# Patient Record
Sex: FEMALE | Race: WHITE | Employment: FULL TIME | URBAN - METROPOLITAN AREA
[De-identification: names, ages, dates, MRNs, and addresses within clinical notes are randomized per-mention and may not be internally consistent; named-entity substitution may affect disease eponyms.]

---

## 2019-08-25 ENCOUNTER — HOSPITAL ENCOUNTER (EMERGENCY)
Facility: CLINIC | Age: 69
Discharge: HOME | End: 2019-08-25
Attending: NURSE PRACTITIONER | Admitting: NURSE PRACTITIONER
Payer: COMMERCIAL

## 2019-08-25 ENCOUNTER — APPOINTMENT (OUTPATIENT)
Dept: ULTRASOUND IMAGING | Facility: CLINIC | Age: 69
End: 2019-08-25
Attending: NURSE PRACTITIONER
Payer: COMMERCIAL

## 2019-08-25 ENCOUNTER — APPOINTMENT (OUTPATIENT)
Dept: GENERAL RADIOLOGY | Facility: CLINIC | Age: 69
End: 2019-08-25
Attending: NURSE PRACTITIONER
Payer: COMMERCIAL

## 2019-08-25 VITALS
TEMPERATURE: 98.8 F | BODY MASS INDEX: 31.83 KG/M2 | WEIGHT: 173 LBS | HEIGHT: 62 IN | SYSTOLIC BLOOD PRESSURE: 146 MMHG | RESPIRATION RATE: 16 BRPM | DIASTOLIC BLOOD PRESSURE: 100 MMHG | OXYGEN SATURATION: 99 %

## 2019-08-25 DIAGNOSIS — M25.562 LEFT KNEE PAIN: ICD-10-CM

## 2019-08-25 DIAGNOSIS — M77.9 ENTHESOPATHY: ICD-10-CM

## 2019-08-25 PROCEDURE — 93971 EXTREMITY STUDY: CPT | Mod: LT

## 2019-08-25 PROCEDURE — 99285 EMERGENCY DEPT VISIT HI MDM: CPT | Mod: 25

## 2019-08-25 PROCEDURE — 73562 X-RAY EXAM OF KNEE 3: CPT | Mod: LT

## 2019-08-25 PROCEDURE — 29505 APPLICATION LONG LEG SPLINT: CPT | Mod: LT

## 2019-08-25 ASSESSMENT — ENCOUNTER SYMPTOMS
ARTHRALGIAS: 1
FEVER: 0
SHORTNESS OF BREATH: 0
MYALGIAS: 1

## 2019-08-25 ASSESSMENT — MIFFLIN-ST. JEOR: SCORE: 1267.97

## 2019-08-25 NOTE — ED TRIAGE NOTES
Pain to lt knee for the past 10 day, no injury, is standing [place] [employer] and increased pain

## 2019-08-25 NOTE — ED PROVIDER NOTES
History     Chief Complaint:  Knee Pain    HPI  Kenyetta Carter is a 69 year old female with a history of a right mensicus tear who presents to the emergency department today for evaluation of left sided knee pain. 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 The patient has trouble bending her left knee and also reports some pain in the back of her leg especially with bending. She does think this feels similar to her previous meniscus tear. The patient denies any fever, chest pain, or shortness of breath. There has not been any trauma to the knee and she denies any recent travel.      PE/DVT RISK FACTORS:  Sex:  female                                                           Hormones- Negative                                        Tobacco- Negative                                          Cancer- Negative                                             Travel- Negative                                              Surgery- Negative                                            Other immobilization- Negative              Personal history of PE/DVT- Negative                     Family history of PE/DVT- Negative                           Allergies:  No known drug allergies    Medications:    The patient is not currently taking any prescribed medications.    Past Medical History:    Meniscus tear right    Past Surgical History:    orthopedic surgery  GYN surgery.    Family History:    History reviewed. No pertinent family history.     Social History:  The patient reports that she has never smoked. She has never used smokeless tobacco.   The patient arrives with her sister    Review of Systems   Constitutional: Negative for fever.   Respiratory: Negative for shortness of breath.    Cardiovascular: Negative for chest pain.   Musculoskeletal: Positive for arthralgias and myalgias.  "  All other systems reviewed and are negative.    Physical Exam     Patient Vitals for the past 24 hrs:   BP Temp Temp src Heart Rate Resp SpO2 Height Weight   08/25/19 2005 (!) 146/100 -- -- -- -- 99 % -- --   08/25/19 1719 (!) 154/95 98.8  F (37.1  C) Oral 75 16 99 % 1.575 m (5' 2\") 78.5 kg (173 lb)     Physical Exam  Nursing notes reviewed. Vitals reviewed.  General: Alert. Well kept.  Eyes: Conjunctiva non-injected, non-icteric.  Neck/Throat: Moist mucous membranes. Normal voice.  Cardiac: Regular rhythm. Normal heart sounds. 2+ DP pulses bilateral.  No peripheral edema.  Normal capillary refill.  Pulmonary: Clear and equal breath sounds bilaterally.   Musculoskeletal: Normal gross range of motion of all other extremities.  Left lower extremity: No foot or ankle deformity, tenderness or swelling. Able to flex and extend toes. Full range of motion of ankle. No knee effusion. No joint line tenderness. Tenderness to palpation of posterior and anterior inferior knee. No laxity with varus or valgus stress testing. Negative anterior and posterior drawer test. Full active flexion and extension at the knee. Full painless ROM at hip.   Neurological: Alert and oriented x4. 5/5 strength bilateral lower extremity. Distal sensation intact.  Skin: No laceration or ecchymosis to affected extremity.   Psych: Affect normal. Good eye contact.    Emergency Department Course     Imaging:  Radiology findings were communicated with the patient who voiced understanding of the findings.  XR Left knee 3 views  IMPRESSION:  Enthesopathic changes of the quadriceps tendon insertion  into the patella. Mild osteopenia. Otherwise negative left knee  x-rays. Reading per radiology    US Lower extremity Duplex Left  IMPRESSION: No evidence for DVT within the left lower extremity. Reading per radiology    Emergency Department Course:  Past medical records, nursing notes, and vitals reviewed.  1736: I performed an exam of the patient and obtained " history, as documented above.     The patient was sent for a knee XR and lower extremity US while in the emergency department, findings above.    2052: I rechecked the patient. Explained findings to patient.    I personally reviewed the laboratory/imaging results with the Patient and answered all related questions prior to discharge.     I rechecked the patient.  Findings and plan explained to the Patient. Patient discharged home with instructions regarding supportive care, medications, and reasons to return. The importance of close follow-up was reviewed.     Impression & Plan      Medical Decision Making:  Kenyetta Carter is a 69 year old female who presents with the above complaint and exam findings as noted above. X-ray s are negative for fracture or malalignment, but does show enthesopathic changes of the quadriceps tendon insertion into the patella.  She has no pain in this area and this is likely an incidental finding. She does have history of significant arthritis of her hands and feet. The patient is neurovascularly intact. Quad and patellar tendon function intact. The knee is grossly stable to stressing without obvious laxity. Given the exam and high clinical suspicion, I suspect internal derangement of the knee including possible meniscal injury versus osteoarthritis. For this reason, the patient will be treated as such with minimal weight bearing and knee immobilization. No erythema, warmth of the joint, or significant pain and septic joint and gout are considered unlikely.  No tenderness at the hip or foot to suggest concurrent injury.  US negative for DVT.  The patient will be treated with a knee immobilizer and I have recommended primary clinic or orthopedic follow up within 1 week for possible outpatient MRI or orthopedic referral. Ice in 20 min intervals and elevation recommended. Return for increasing pain, extremity weakness, numbness, or for any other questions or concerns discussed. The patient  voiced an understanding and is in agreement with the treatment plan and need for follow up for likely additional imaging or referral.     Diagnosis:    ICD-10-CM   1. Left knee pain M25.562   2. Enthesopathy M77.9       Disposition:   discharged to home      Scribe Disclosure:  I, Ibis Weir, am serving as a scribe at 5:36 PM on 8/25/2019 to document services personally performed by Letha Mesa DNP based on my observations and the provider's statements to me.     EMERGENCY DEPARTMENT       Letha Mesa, CNP  08/25/19 7497

## 2019-08-25 NOTE — ED AVS SNAPSHOT
Emergency Department  [address] [place] [id number]  Phone:  [phone]  Fax:  [phone]                                    Kenyetta Carter   MRN: [id number]    Department:   Emergency Department   Date of Visit:  8/25/2019           After Visit Summary Signature Page    I have received my discharge instructions, and my questions have been answered. I have discussed any challenges I see with this plan with the nurse or doctor.    ..........................................................................................................................................  Patient/Patient Representative Signature      ..........................................................................................................................................  Patient Representative Print Name and Relationship to Patient    ..................................................               ................................................  Date                                   Time    ..........................................................................................................................................  Reviewed by Signature/Title    ...................................................              ..............................................  Date                                               Time          22EPIC Rev 08/18